# Patient Record
Sex: FEMALE | Race: OTHER | URBAN - METROPOLITAN AREA
[De-identification: names, ages, dates, MRNs, and addresses within clinical notes are randomized per-mention and may not be internally consistent; named-entity substitution may affect disease eponyms.]

---

## 2017-05-16 ENCOUNTER — IMPORTED ENCOUNTER (OUTPATIENT)
Dept: URBAN - METROPOLITAN AREA CLINIC 50 | Facility: CLINIC | Age: 82
End: 2017-05-16

## 2017-05-22 ENCOUNTER — IMPORTED ENCOUNTER (OUTPATIENT)
Dept: URBAN - METROPOLITAN AREA CLINIC 50 | Facility: CLINIC | Age: 82
End: 2017-05-22

## 2017-11-16 ENCOUNTER — IMPORTED ENCOUNTER (OUTPATIENT)
Dept: URBAN - METROPOLITAN AREA CLINIC 50 | Facility: CLINIC | Age: 82
End: 2017-11-16

## 2018-01-02 NOTE — PATIENT DISCUSSION
PHOTOGRAPHS: I have reviewed the external ocular photographs of this patient which show the following: significant entropion of the right lower eyelid.

## 2018-01-02 NOTE — PATIENT DISCUSSION
Entropion Counseling:  I have examined the patient today and found entropion due to laxity of the lower eyelid. I have discussed at length the anatomy and causation of the problem. The risks and benefits of a surgical repair was discussed in detail as well as the consequences of leaving an entropion untreated. We also discussed the location of the incision and the recovery process. The patient understands and desires to proceed with the surgery as explained.

## 2018-04-06 NOTE — PATIENT DISCUSSION
Also, please do not hesitate to call us if you have any concerns not addressed by this information. Please call [phone] and we will do everything we can to help you during this period.

## 2019-05-17 ENCOUNTER — IMPORTED ENCOUNTER (OUTPATIENT)
Dept: URBAN - METROPOLITAN AREA CLINIC 50 | Facility: CLINIC | Age: 84
End: 2019-05-17

## 2019-07-08 ENCOUNTER — IMPORTED ENCOUNTER (OUTPATIENT)
Dept: URBAN - METROPOLITAN AREA CLINIC 50 | Facility: CLINIC | Age: 84
End: 2019-07-08

## 2021-04-18 ASSESSMENT — VISUAL ACUITY
OD_CC: J1+
OD_SC: 20/30-
OS_PH: 20/40
OS_SC: 20/40
OS_SC: 20/50-
OD_SC: 20/30-
OD_CC: J1+
OS_CC: J1+
OD_PH: 20/30
OS_CC: J1+
OD_SC: 20/40
OS_SC: 20/50-

## 2021-04-18 ASSESSMENT — TONOMETRY
OS_IOP_MMHG: 15
OD_IOP_MMHG: 12
OD_IOP_MMHG: 16
OD_IOP_MMHG: 12
OS_IOP_MMHG: 11
OS_IOP_MMHG: 12

## 2021-10-26 ENCOUNTER — PREPPED CHART (OUTPATIENT)
Dept: URBAN - METROPOLITAN AREA CLINIC 49 | Facility: CLINIC | Age: 86
End: 2021-10-26

## 2021-11-03 ENCOUNTER — ANNUAL COMPREHENSIVE EXAM (OUTPATIENT)
Dept: URBAN - METROPOLITAN AREA CLINIC 49 | Facility: CLINIC | Age: 86
End: 2021-11-03

## 2021-11-03 DIAGNOSIS — H35.362: ICD-10-CM

## 2021-11-03 DIAGNOSIS — H02.831: ICD-10-CM

## 2021-11-03 DIAGNOSIS — H04.121: ICD-10-CM

## 2021-11-03 DIAGNOSIS — H43.813: ICD-10-CM

## 2021-11-03 DIAGNOSIS — H02.834: ICD-10-CM

## 2021-11-03 PROCEDURE — 92014 COMPRE OPH EXAM EST PT 1/>: CPT

## 2021-11-03 ASSESSMENT — VISUAL ACUITY
OD_PH: 20/30+
OS_CC: 20/40
OS_PH: 20/30
OD_CC: 20/30

## 2021-11-03 ASSESSMENT — TONOMETRY
OS_IOP_MMHG: 12
OD_IOP_MMHG: 13

## 2021-11-03 NOTE — PATIENT DISCUSSION
Patient stated she rather have surgery in office at [facility] office secondary to being afraid of going under medication.

## 2021-11-03 NOTE — PATIENT DISCUSSION
Will need Ptosis VF and external photos to confirm patient meets medical insurance criteria for blepharoplasty. OD ONLY OS qualified back in 2019.

## 2022-02-07 ENCOUNTER — DIAGNOSTICS ONLY (OUTPATIENT)
Dept: URBAN - METROPOLITAN AREA CLINIC 49 | Facility: CLINIC | Age: 87
End: 2022-02-07

## 2022-02-07 DIAGNOSIS — H02.834: ICD-10-CM

## 2022-02-07 DIAGNOSIS — H02.831: ICD-10-CM

## 2022-02-07 PROCEDURE — 92082 INTERMEDIATE VISUAL FIELD XM: CPT

## 2022-02-07 PROCEDURE — 92285 EXTERNAL OCULAR PHOTOGRAPHY: CPT

## 2022-02-07 NOTE — PATIENT DISCUSSION
Patient stated she rather have surgery in office at [facility] office secondary to being afraid of going under medication.

## 2022-04-18 ENCOUNTER — PRE-OP/H&P (OUTPATIENT)
Dept: URBAN - METROPOLITAN AREA CLINIC 49 | Facility: CLINIC | Age: 87
End: 2022-04-18

## 2022-04-18 VITALS — HEIGHT: 55 IN | DIASTOLIC BLOOD PRESSURE: 79 MMHG | SYSTOLIC BLOOD PRESSURE: 138 MMHG | HEART RATE: 85 BPM

## 2022-04-18 DIAGNOSIS — H02.834: ICD-10-CM

## 2022-04-18 DIAGNOSIS — H02.831: ICD-10-CM

## 2022-04-18 PROCEDURE — 92285 EXTERNAL OCULAR PHOTOGRAPHY: CPT

## 2022-04-18 PROCEDURE — PREOP PRE OP VISIT

## 2022-04-18 ASSESSMENT — VISUAL ACUITY
OD_SC: 20/40
OS_SC: 20/50+

## 2022-04-18 NOTE — PATIENT DISCUSSION
Bilateral Upper Lid Blepharoplasty will be performed at [address] [place] [address] in [place] 05/04/2022 in minor procedure room per patient request.

## 2022-05-04 ENCOUNTER — CLINIC PROCEDURE ONLY (OUTPATIENT)
Dept: URBAN - METROPOLITAN AREA CLINIC 49 | Facility: CLINIC | Age: 87
End: 2022-05-04

## 2022-05-04 VITALS — DIASTOLIC BLOOD PRESSURE: 91 MMHG | HEART RATE: 89 BPM | HEIGHT: 55 IN | SYSTOLIC BLOOD PRESSURE: 149 MMHG

## 2022-05-04 DIAGNOSIS — H02.834: ICD-10-CM

## 2022-05-04 DIAGNOSIS — H02.831: ICD-10-CM

## 2022-05-04 PROCEDURE — 1582350 UPPER BLEPH PER EYE FUNCTIONAL-BILATERAL

## 2022-05-04 ASSESSMENT — VISUAL ACUITY
OD_SC: 20/60-1
OS_SC: 20/60

## 2022-05-04 ASSESSMENT — TONOMETRY
OD_IOP_MMHG: 12
OS_IOP_MMHG: 12

## 2022-05-04 NOTE — PATIENT DISCUSSION
Dermatochalasis Bilateral UL. Risks and benefits of eyelid surgery discussed including bruising and swelling, infection, dry eye, asymmetry, loss of vision, and/or need for additional surgery. All questions answered. Patients wishes to proceed with lid surgery.

## 2022-05-11 ENCOUNTER — POST-OP (OUTPATIENT)
Dept: URBAN - METROPOLITAN AREA CLINIC 49 | Facility: CLINIC | Age: 87
End: 2022-05-11

## 2022-05-11 DIAGNOSIS — Z98.890: ICD-10-CM

## 2022-05-11 PROCEDURE — 99024 POSTOP FOLLOW-UP VISIT: CPT

## 2022-05-11 ASSESSMENT — VISUAL ACUITY
OS_SC: 20/50
OD_SC: 20/40

## 2022-06-01 ENCOUNTER — POST-OP (OUTPATIENT)
Dept: URBAN - METROPOLITAN AREA CLINIC 49 | Facility: CLINIC | Age: 87
End: 2022-06-01

## 2022-06-01 DIAGNOSIS — Z98.890: ICD-10-CM

## 2022-06-01 PROCEDURE — 92285 EXTERNAL OCULAR PHOTOGRAPHY: CPT

## 2022-06-01 PROCEDURE — 99024 POSTOP FOLLOW-UP VISIT: CPT

## 2022-06-01 ASSESSMENT — TONOMETRY
OS_IOP_MMHG: 11
OD_IOP_MMHG: 10

## 2022-06-01 ASSESSMENT — VISUAL ACUITY
OD_SC: 20/30-1
OU_SC: 20/30-1
OS_SC: 20/40-1

## 2022-11-09 ENCOUNTER — COMPREHENSIVE EXAM (OUTPATIENT)
Dept: URBAN - METROPOLITAN AREA CLINIC 49 | Facility: CLINIC | Age: 87
End: 2022-11-09

## 2022-11-09 DIAGNOSIS — H35.362: ICD-10-CM

## 2022-11-09 PROCEDURE — 92134 CPTRZ OPH DX IMG PST SGM RTA: CPT

## 2022-11-09 PROCEDURE — 92014 COMPRE OPH EXAM EST PT 1/>: CPT

## 2022-11-09 ASSESSMENT — TONOMETRY
OD_IOP_MMHG: 12
OS_IOP_MMHG: 11

## 2022-11-09 ASSESSMENT — VISUAL ACUITY
OS_PH: 20/40
OS_SC: 20/100
OU_SC: J1
OD_SC: 20/40

## 2022-11-09 NOTE — PATIENT DISCUSSION
Discussed with patient that one small spot of drusen was seen in exam. That it doesn't seem it would be a problem.

## 2024-01-10 ENCOUNTER — COMPREHENSIVE EXAM (OUTPATIENT)
Dept: URBAN - METROPOLITAN AREA CLINIC 49 | Facility: LOCATION | Age: 89
End: 2024-01-10

## 2024-01-10 DIAGNOSIS — H35.372: ICD-10-CM

## 2024-01-10 DIAGNOSIS — H35.362: ICD-10-CM

## 2024-01-10 DIAGNOSIS — H04.121: ICD-10-CM

## 2024-01-10 DIAGNOSIS — H43.813: ICD-10-CM

## 2024-01-10 DIAGNOSIS — H52.4: ICD-10-CM

## 2024-01-10 PROCEDURE — 92134 CPTRZ OPH DX IMG PST SGM RTA: CPT

## 2024-01-10 PROCEDURE — 92015 DETERMINE REFRACTIVE STATE: CPT

## 2024-01-10 PROCEDURE — 92014 COMPRE OPH EXAM EST PT 1/>: CPT

## 2024-01-10 ASSESSMENT — TONOMETRY
OS_IOP_MMHG: 12
OD_IOP_MMHG: 12

## 2024-01-10 ASSESSMENT — VISUAL ACUITY
OS_SC: J1+
OU_SC: J1+
OD_SC: J1+
OU_SC: 20/30
OD_SC: 20/30
OS_SC: 20/60-2

## 2024-04-30 ENCOUNTER — CONTACT LENSES/GLASSES VISIT (OUTPATIENT)
Dept: URBAN - METROPOLITAN AREA CLINIC 49 | Facility: LOCATION | Age: 89
End: 2024-04-30

## 2024-04-30 DIAGNOSIS — H53.2: ICD-10-CM

## 2024-04-30 PROCEDURE — 92060 SENSORIMOTOR EXAMINATION: CPT

## 2024-04-30 ASSESSMENT — VISUAL ACUITY
OD_SC: 20/30-2
OS_CC: 20/40
OS_SC: 20/80
OS_PH: 20/40
OU_CC: J3
OD_CC: 20/30

## 2025-02-20 ENCOUNTER — COMPREHENSIVE EXAM (OUTPATIENT)
Age: 89
End: 2025-02-20

## 2025-02-20 DIAGNOSIS — H53.2: ICD-10-CM

## 2025-02-20 DIAGNOSIS — H35.362: ICD-10-CM

## 2025-02-20 DIAGNOSIS — H43.813: ICD-10-CM

## 2025-02-20 DIAGNOSIS — H04.121: ICD-10-CM

## 2025-02-20 DIAGNOSIS — H35.372: ICD-10-CM

## 2025-02-20 PROCEDURE — 99214 OFFICE O/P EST MOD 30 MIN: CPT

## 2025-04-15 ENCOUNTER — CONTACT LENSES/GLASSES VISIT (OUTPATIENT)
Age: 89
End: 2025-04-15

## 2025-04-15 DIAGNOSIS — H53.2: ICD-10-CM

## 2025-04-15 PROCEDURE — 92060 SENSORIMOTOR EXAMINATION: CPT

## 2025-05-20 ENCOUNTER — CONTACT LENSES/GLASSES VISIT (OUTPATIENT)
Age: 89
End: 2025-05-20

## 2025-05-20 DIAGNOSIS — H53.2: ICD-10-CM

## 2025-05-20 PROCEDURE — 92060 SENSORIMOTOR EXAMINATION: CPT | Mod: NC
